# Patient Record
Sex: MALE | Race: BLACK OR AFRICAN AMERICAN | Employment: UNEMPLOYED | URBAN - NONMETROPOLITAN AREA
[De-identification: names, ages, dates, MRNs, and addresses within clinical notes are randomized per-mention and may not be internally consistent; named-entity substitution may affect disease eponyms.]

---

## 2022-07-06 ENCOUNTER — HOSPITAL ENCOUNTER (EMERGENCY)
Age: 27
Discharge: HOME | End: 2022-07-06
Attending: FAMILY MEDICINE
Payer: COMMERCIAL

## 2022-07-06 VITALS
TEMPERATURE: 98.4 F | RESPIRATION RATE: 18 BRPM | WEIGHT: 150 LBS | HEIGHT: 72 IN | HEART RATE: 105 BPM | BODY MASS INDEX: 20.32 KG/M2 | SYSTOLIC BLOOD PRESSURE: 147 MMHG | OXYGEN SATURATION: 100 % | DIASTOLIC BLOOD PRESSURE: 87 MMHG

## 2022-07-06 DIAGNOSIS — F20.9 SCHIZOPHRENIA, UNSPECIFIED TYPE (HCC): Primary | ICD-10-CM

## 2022-07-06 DIAGNOSIS — F31.9 BIPOLAR AFFECTIVE DISORDER, REMISSION STATUS UNSPECIFIED (HCC): ICD-10-CM

## 2022-07-06 PROCEDURE — 74011250637 HC RX REV CODE- 250/637: Performed by: FAMILY MEDICINE

## 2022-07-06 PROCEDURE — 99283 EMERGENCY DEPT VISIT LOW MDM: CPT

## 2022-07-06 PROCEDURE — 74011250637 HC RX REV CODE- 250/637

## 2022-07-06 RX ORDER — ARIPIPRAZOLE 20 MG/1
20 TABLET ORAL DAILY
Qty: 20 TABLET | Refills: 0 | Status: SHIPPED | OUTPATIENT
Start: 2022-07-06

## 2022-07-06 RX ORDER — HYDROXYZINE 25 MG/1
TABLET, FILM COATED ORAL
COMMUNITY

## 2022-07-06 RX ORDER — IBUPROFEN 600 MG/1
600 TABLET ORAL
Status: DISCONTINUED | OUTPATIENT
Start: 2022-07-06 | End: 2022-07-06 | Stop reason: HOSPADM

## 2022-07-06 RX ORDER — ARIPIPRAZOLE 5 MG/1
TABLET ORAL
Status: COMPLETED
Start: 2022-07-06 | End: 2022-07-06

## 2022-07-06 RX ORDER — ARIPIPRAZOLE 20 MG/1
20 TABLET ORAL
Status: COMPLETED | OUTPATIENT
Start: 2022-07-06 | End: 2022-07-06

## 2022-07-06 RX ORDER — OLANZAPINE 5 MG/1
TABLET ORAL
COMMUNITY

## 2022-07-06 RX ORDER — ARIPIPRAZOLE 400 MG
400 KIT INTRAMUSCULAR
COMMUNITY
End: 2022-07-06

## 2022-07-06 RX ORDER — IBUPROFEN 600 MG/1
600 TABLET ORAL
Status: COMPLETED | OUTPATIENT
Start: 2022-07-06 | End: 2022-07-06

## 2022-07-06 RX ORDER — ARIPIPRAZOLE 20 MG/1
20 TABLET ORAL DAILY
COMMUNITY

## 2022-07-06 RX ADMIN — IBUPROFEN 600 MG: 600 TABLET, FILM COATED ORAL at 02:33

## 2022-07-06 RX ADMIN — ARIPIPRAZOLE 20 MG: 5 TABLET ORAL at 02:22

## 2022-07-06 RX ADMIN — ARIPIPRAZOLE 20 MG: 20 TABLET ORAL at 02:22

## 2022-07-06 NOTE — ED TRIAGE NOTES
Patient and mother reports patient is out of his monthly IM Abilify and his back up daily Abilify pills. Request medication refill.

## 2022-07-06 NOTE — ED NOTES
I have reviewed discharge instructions with the patient and parent. The patient and parent verbalized understanding.  Patient escorted to waiting room with mother, Norma moreno

## 2022-07-06 NOTE — ED PROVIDER NOTES
EMERGENCY DEPARTMENT HISTORY AND PHYSICAL EXAM      Date: 7/6/2022  Patient Name: John Mixon    History of Presenting Illness     Chief Complaint   Patient presents with    Medication Refill       History Provided By: Patient    HPI: John Mixon, 23 y.o. male with a significant past medical history of schizophrenia, bipolar disorder presents to the ED with request for medication refill. He is followed by psychiatry, typically gets Abilify 400 mg IM monthly, when unable he takes 20 mg of Abilify daily. He ran out of medication 1 month ago, has an appointment on 7/25 with his psychiatrist.  He is here today with his mother and grandmother, they report that his behavior today has become more erratic, he is pacing, admits to hallucinations. He reports no suicidal intent, no intent or plan to harm his family members. He has taken a leftover olanzapine to try to blunt his mood, has not been effective. There are no other complaints, changes, or physical findings at this time. PCP: Starr Galvin MD    No current facility-administered medications on file prior to encounter. Current Outpatient Medications on File Prior to Encounter   Medication Sig Dispense Refill    ARIPiprazole (Abilify) 20 mg tablet Take 20 mg by mouth daily.  hydrOXYzine HCL (ATARAX) 25 mg tablet Take  by mouth three (3) times daily as needed.  OLANZapine (ZyPREXA) 5 mg tablet Take  by mouth nightly as needed.  [DISCONTINUED] ARIPiprazole (ABILIFY MAINTENA) 400 mg injection 400 mg by IntraMUSCular route every twenty-eight (28) days. Past History     Past Medical History:  Past Medical History:   Diagnosis Date    Anxiety     Bipolar 1 disorder ([facility] [address]     Psychiatric disorder     Schizophrenia ([facility]        Past Surgical History:  History reviewed. No pertinent surgical history. Family History:  History reviewed. No pertinent family history.     Social History:  Social History Tobacco Use    Smoking status: Current Every Day Smoker     Packs/day: 0.50    Smokeless tobacco: Never Used   Substance Use Topics    Alcohol use: Yes    Drug use: Yes     Comment: Kannoemi       Allergies: Allergies   Allergen Reactions    Gentamicin Unknown (comments)       Review of Systems   Review of Systems   Constitutional: Negative for chills and fever. HENT: Negative for ear pain, rhinorrhea, sneezing and sore throat. Eyes: Negative for pain and discharge. Respiratory: Negative for cough, shortness of breath and wheezing. Cardiovascular: Negative for chest pain. Gastrointestinal: Negative for abdominal pain, constipation, diarrhea, nausea and vomiting. Endocrine: Negative for polydipsia and polyphagia. Genitourinary: Negative for dysuria, flank pain, frequency, hematuria and urgency. Musculoskeletal: Negative for arthralgias, back pain, joint swelling and neck pain. Skin: Negative for rash. Neurological: Negative for dizziness, weakness, light-headedness, numbness and headaches. Hematological: Negative for adenopathy. Psychiatric/Behavioral: Negative for agitation, behavioral problems and self-injury. All other systems reviewed and are negative. Physical Exam   Physical Exam  Vitals reviewed. Constitutional:       General: He is not in acute distress. Appearance: Normal appearance. Cardiovascular:      Rate and Rhythm: Normal rate and regular rhythm. Heart sounds: Normal heart sounds. Pulmonary:      Effort: Pulmonary effort is normal.      Breath sounds: Normal breath sounds. Abdominal:      General: Abdomen is flat. Palpations: Abdomen is soft. Musculoskeletal:      Cervical back: Normal range of motion. Neurological:      Mental Status: He is alert. Psychiatric:         Attention and Perception: Attention normal. He perceives auditory hallucinations. Speech: Speech is delayed.          Behavior: Behavior normal. Behavior is cooperative. Comments: Patient is answering questions appropriately in the emergency department. A little delayed, soft speech. No agitation or aggression is noted. Lab and Diagnostic Study Results   Labs -   No results found for this or any previous visit (from the past 12 hour(s)). Radiologic Studies -   @lastxrresult@  CT Results  (Last 48 hours)    None        CXR Results  (Last 48 hours)    None          Medical Decision Making and ED Course   - I am the first provider for this patient. I reviewed the vital signs, available nursing notes, past medical history, past surgical history, family history and social history. - Initial assessment performed. The patients presenting problems have been discussed, and they are in agreement with the care plan formulated and outlined with them. I have encouraged them to ask questions as they arise throughout their visit. Vital Signs-Reviewed the patient's vital signs. Patient Vitals for the past 12 hrs:   Temp Pulse Resp BP SpO2   07/06/22 0208 98.4 °F (36.9 °C) (!) 105 18 (!) 147/87 100 %       Differential Diagnosis & Medical Decision Making Provider Note:   Patient is seen and evaluated shortly after arrival, his mother and grandmother present throughout interview and exam, they do assist with some history. Patient is answering questions slowly but appropriately. Orts having hallucinations today that are not harmful, he has been pacing, had increased agitation. He reports no intent to harm himself or family members. Differential diagnosis is reviewed and discussed, to include schizophrenic exacerbation, bipolar disorder, auditory/visual hallucinations, medication noncompliance, other psychiatric disorder. Patient and family are requesting a dose of Abilify in the emergency department and prescription. None of the family feel that he needs to be admitted to behavioral medicine, he denies intent to harm himself or others.   He is given Abilify 20 mg p.o., tolerated well and without difficulty, prescription is given, contact information is given for [facility]. [facility]     ED Course:        Procedures   Performed by: Kashif Walsh,   Procedures      Disposition   Disposition: Condition stable  DC- Adult Discharges: All of the diagnostic tests were reviewed and questions answered. Diagnosis, care plan and treatment options were discussed. The patient understands the instructions and will follow up as directed. The patients results have been reviewed with them. They have been counseled regarding their diagnosis. The patient and parent verbally convey understanding and agreement of the signs, symptoms, diagnosis, treatment and prognosis and additionally agrees to follow up as recommended with their PCP in 24 - 48 hours. They also agree with the care-plan and convey that all of their questions have been answered. I have also put together some discharge instructions for them that include: 1) educational information regarding their diagnosis, 2) how to care for their diagnosis at home, as well a 3) list of reasons why they would want to return to the ED prior to their follow-up appointment, should their condition change. Schizophrenia  DISCHARGE PLAN:  1. There are no discharge medications for this patient. 2.   Follow-up Information     Follow up With Specialties Details Why [address] [facility] in 1 day  [facility]  [phone]        3. Return to ED if worse   4. Current Discharge Medication List      START taking these medications    Details   !! ARIPiprazole (ABILIFY) 20 mg tablet Take 1 Tablet by mouth daily. Qty: 20 Tablet, Refills: 0  Start date: 7/6/2022       !! - Potential duplicate medications found. Please discuss with provider.       CONTINUE these medications which have NOT CHANGED    Details   !! ARIPiprazole (Abilify) 20 mg tablet Take 20 mg by mouth daily. !! - Potential duplicate medications found. Please discuss with provider. Diagnosis/Clinical Impression     Clinical Impression:   1. Schizophrenia, unspecified type ([address]    2. Bipolar affective disorder, remission status unspecified ([facility] [address]        Attestations: Dangelo Carson, DO    Please note that this dictation was completed with kidthing, the computer voice recognition software. Quite often unanticipated grammatical, syntax, homophones, and other interpretive errors are inadvertently transcribed by the computer software. Please disregard these errors. Please excuse any errors that have escaped final proofreading. Thank you.